# Patient Record
Sex: MALE | Race: WHITE
[De-identification: names, ages, dates, MRNs, and addresses within clinical notes are randomized per-mention and may not be internally consistent; named-entity substitution may affect disease eponyms.]

---

## 2020-02-17 ENCOUNTER — HOSPITAL ENCOUNTER (OUTPATIENT)
Dept: HOSPITAL 88 - MRI | Age: 35
End: 2020-02-17
Attending: SPECIALIST
Payer: COMMERCIAL

## 2020-02-17 DIAGNOSIS — S83.221A: Primary | ICD-10-CM

## 2020-02-17 NOTE — DIAGNOSTIC IMAGING REPORT
Right knee MRI without contrast.



History: Knee pain. Meniscus tear. Decreased range of motion.



Comparison: None.



Technique: Multiplanar multi-sequence MRI of the knee without contrast.



Findings: 



Medial compartment:  No meniscal tear or cartilage abnormality. Mild sprain of

the medial collateral ligament. The majority of the fibers are intact.



Lateral compartment: Mild degeneration and fraying of the lateral meniscus. No

lateral meniscus tear. Focal full-thickness articular cartilage defect at the

central weightbearing portion of the lateral femoral condyle with underlying

bone marrow edema best seen on coronal image 17. Bone contusion with bone

marrow edema at the posterior lateral tibial plateau. The lateral collateral

ligament complex is intact.



Intercondylar notch: Full-thickness anterior cruciate ligament tear best seen

on sagittal image 23. The posterior cruciate ligament is intact.



Patellofemoral compartment:  No chondromalacia or patellar dislocation.



Extensor mechanism:  The quadriceps and patellar tendons are normal.  



Other findings:  There is a large joint effusion and synovitis.  There is no

acute fracture, subluxation or avascular necrosis. Soft tissue edema about the

knee



IMPRESSION:  

Full-thickness anterior cruciate ligament tear 



Focal full-thickness articular cartilage defect at the central weightbearing

portion of the lateral femoral condyle with underlying bone marrow edema. Bone

contusion with bone marrow edema at the posterior lateral tibial plateau.



Mild sprain of the medial collateral ligament. The majority of the fibers are

intact.



Joint effusion and synovitis with soft tissue edema about the knee.



Signed by: Dr. German Yan M.D. on 2/17/2020 2:55 PM

## 2020-05-21 NOTE — XMS REPORT
Encounter??CCD:??10/14/2011??to??10/14/2011

                             Created on: 2079



SVETLANA RODRIGUES

External Reference #: [id number]

: 1985

Sex: Male



Demographics





                          Address                   [address] [place] [address]  [address]

 

                          Home Phone                [phone]

 

                          Preferred Language        Unknown

 

                          Marital Status            S

 

                          Amish Affiliation     [id number]

 

                          Race                      2028

 

                          Ethnic Group              OTH





Author





                          Author                    [employer] SVETLANA Cintron              [facility]

 

                          Address                   Unknown

 

                          Phone                     Unavailable







Care Team Providers





                    Care Team Member Name Role                Phone

 

                    Mike, April         CP                  [phone]

 

                    Mee Kurtz        CP                  Unavailable

 

                    [employer], Login  CP                  Unavailable

 

                    Penny Hdez    CP                  Unavailable

 

                    Sommer Roberson CP                  [phone]

 

                    Cynthia Benitez    CP                  Unavailable

 

                    Venu George     CP                  Unavailable

 

                    Faisal King  CP                  [phone]

 

                    Jose Mclaughlin        CP                  [phone]

 

                    Ana Huffman       CP                  [phone]

 

                    Marlys Rashid CP                  Unavailable







Allergies, Adverse Reactions, Alerts





  



                     Substance           Reaction            Status

 

  



                     NKDA                ??                  Active







Medications





    



              Medication   Instructions  Start Date   End Date     Status

 

    



              naproxen 500 mg oral  500 mg, PO, BID, PRN, 14 tab, Pain,  10/14/2

011   ??           

Ordered



                           enteric coated            Substitution Allowed   



                                         tablet    

 

    



              acetaminophen-hydroc  1-2 tab, PO, Q4-6H, PRN, 15 tab,  10/14/2011

   ??           Ordered



                           odone 500 mg-5 mg         Pain, Substitution Allowed,

   



                           oral tablet               Maintenance   

 

    



              Toradol 30 mg/mL  60 mg, Route: IM, ONCE, Start date:  10/14/2011 

  10/14/2011   

Completed



                           injectable solution       10/14/11 15:25:00, Stop aaron

e:   



                                         10/14/11 15:25:00   







Vital Signs





                    Most recent to oldest [Reference Range]: 1                  

 2

 

                          Height                    187.96 cm 

                          (10/14/2011 14:54:00) ??  ??

 

                          Temperature Oral [96.4-99.1 DegF] 98.0 DegF 

                          (10/14/2011 15:52:00) ??  97.7 DegF 

                                        (10/14/2011 14:54:00) ??

 

                          Systolic Blood Pressure [ mmHg] 145 mmHg 

*HI*

                          (10/14/2011 15:52:00) ??  165 mmHg 

*HI*

                                        (10/14/2011 14:54:00) ??

 

                          Diastolic Blood Pressure [60-90 mmHg] 80 mmHg 

                          (10/14/2011 15:52:00) ??  84 mmHg 

                                        (10/14/2011 14:54:00) ??

 

                          Respiratory Rate [14-20 BRMIN] 18 BRMIN 

                          (10/14/2011 15:52:00) ??  16 BRMIN 

                                        (10/14/2011 14:54:00) ??

 

                          Peripheral Pulse Rate [ bpm] 74 bpm 

                          (10/14/2011 15:52:00) ??  81 bpm 

                                        (10/14/2011 14:54:00) ??

 

                          Weight                    103.182 kg 

                          (10/14/2011 14:54:00) ??  ??

## 2020-05-21 NOTE — XMS REPORT
Patient Summary Document

                             Created on: 2020



SVETLANA RODRIGUES

External Reference #: [id number]

: 1985

Sex: Male



Demographics





                          Address                   [address] [place]  [address]

 

                          Home Phone                [phone]

 

                          Preferred Language        English

 

                          Marital Status            Unknown

 

                          Samaritan Affiliation     Unknown

 

                          Race                      Unknown

 

                                        Additional Race(s) 





 

                          Ethnic Group              Unknown





Author





                          Author                    [facility]

t

 

                          Organization              [facility]

 

                          Address                   [address]

[place]  [address]



 

                          Phone                     Unavailable







Care Team Providers





                    Care Team Member Name Role                Phone

 

                    DENISE CAM             Unavailable







Problems





           Condition Name Condition Details Condition Category Status     Onset 

Date Resolution

Date            Last Treatment Date Treating Clinician Comments        Source

 

                          LEG PAIN                                          LEG 

PAIN                        Active         

              10/14/2011                                                        
                                       [facility]                     

Diagnosis Active    2011-10-14 00:00:00           2011-10-14 15:37:00           

          [facility]







Allergies, Adverse Reactions, Alerts

This patient has no known allergies or adverse reactions.



Medications





             Ordered Medication Name Filled Medication Name Start Date   Stop Da

te    Current 

Medication? Ordering Clinician Indication Dosage     Frequency  Signature (SIG) 

Comments                  Components                Source

 

           naproxen 500 mg oral enteric coated tablet            2011-10-14 20:4

6:29            Yes        Sommer 

Cheryl Hanzik                                  500 mg, PO, BID, PRN, 14 tab, Adriano

n, Substitution Allowed             

                                        [facility]

 

           acetaminophen-hydrocodone 500 mg-5 mg oral tablet            2011-10-

14 20:46:26            Yes        

Sommer Cheryl Hanzik                                                 1-2 tab, PO

, Q4-6H, PRN, 15 tab, Pain, Substitution 

Allowed, Maintenance                                         [facility]

 

           Toradol 30 mg/mL injectable solution            2011-10-14 20:25:00  

          No         Sommer Cheryl 

Hanzik                                                          60 mg, Route: IM

, ONCE, Start date: 10/14/11 15:25:00, Stop date: 

10/14/11 15:25:00                                           [facility]







Vital Signs





             Vital Name   Observation Time Observation Value Comments     Source

 

             Diastolic (mm Hg) 2011-10-14 20:52:00                           [facility]

 

             Heart Rate   2011-10-14 20:52:00                           [facility]

 

             Systolic (mm Hg) 2011-10-14 20:52:00                            [facility]

 

             Respitory Rate 2011-10-14 20:52:00                            [facility]

 

             Temperature Oral (F) 2011-10-14 20:52:00 98.0 F                    

[facility]

 

             Weight       2011-10-14 19:54:00                           [facility]

 

             Height       2011-10-14 19:54:00 187.96 cm                 [facility]

 

             Systolic (mm Hg) 2011-10-14 19:54:00                            [facility]

 

             Temperature Oral (F) 2011-10-14 19:54:00 97.7 F                    

[facility]

 

             Respitory Rate 2011-10-14 19:54:00                            [facility]

 

             Diastolic (mm Hg) 2011-10-14 19:54:00                           [facility]

 

             Heart Rate   2011-10-14 19:54:00                           [facility]







Procedures

This patient has no known procedures.



Encounters





             Start Date/Time End Date/Time Encounter Type Admission Type Attendi

[facility]   Care Department Encounter ID    Source

 

        2018 00:00:00 2018 00:00:00 [facility]patient                 [facility] 

   [facility]    [id number] 

[facility]

 

        2011-10-14 14:53:00 2011-10-14 16:51:00 Emergency                 [facility]

[facility] 

[id number]                            [facility]







Results





           Test Description Test Time  Test Comments Results    Result Comments 

Source

 

                MRI RIGHT KNEE WO 2020 14:50:00                           

                              

                                             [place] [address]  
   Patient Name: SVETLANA RODRIGUES                                   MR #: 
[id number]                     : 1985                                  
Age/Sex: 34/M  Acct #: [id number]                              Req #: [id number]
[id number]  Adm Physician:                                                      
Ordered by: DENISE CAM MD                            Report #: [id number] 
      Location: MRI                                     Room/Bed:               
     
________________________________________________________________________________

___________________    Procedure: [id number] MRI/MRI RIGHT KNEE WO  Exam Date:   
                         Exam Time:                                             
 REPORT STATUS: Signed    Right knee MRI without contrast.      History: Knee 
pain. Meniscus tear. Decreased range of motion.      Comparison: None.      
Technique: Multiplanar multi-sequence MRI of the knee without contrast.      
Findings:       Medial compartment:  No meniscal tear or cartilage abnormality. 
Mild sprain of   the medial collateral ligament. The majority of the fibers are 
intact.      Lateral compartment: Mild degeneration and fraying of the lateral 
meniscus. No   lateral meniscus tear. Focal full-thickness articular cartilage 
defect at the   central weightbearing portion of the lateral femoral condyle 
with underlying   bone marrow edema best seen on coronal image 17. Bone 
contusion with bone   marrow edema at the posterior lateral tibial plateau. The 
lateral collateral   ligament complex is intact.      Intercondylar notch: Full-
thickness anterior cruciate ligament tear best seen   on sagittal image 23. The 
posterior cruciate ligament is intact.      Patellofemoral compartment:  No 
chondromalacia or patellar dislocation.      Extensor mechanism:  The quadriceps
and patellar tendons are normal.        Other findings:  There is a large joint 
effusion and synovitis.  There is no   acute fracture, subluxation or avascular 
necrosis. Soft tissue edema about the   knee      IMPRESSION:     Full-thickness
anterior cruciate ligament tear       Focal full-thickness articular cartilage 
defect at the central weightbearing   portion of the lateral femoral condyle 
with underlying bone marrow edema. Bone   contusion with bone marrow edema at 
the posterior lateral tibial plateau.      Mild sprain of the medial collateral 
ligament. The majority of the fibers are   intact.      Joint effusion and 
synovitis with soft tissue edema about the knee.      Signed by: Dr. German Yan M.D. on 2020 2:55 PM        Dictated By: GERMAN YAN MD, MD  
Electronically Signed By: GERMAN YAN MD, MD on 20 0960  Transcribed By: 
CELESTINO on 20 4590       COPY TO:   DENISE CAM MD

## 2020-05-21 NOTE — XMS REPORT
Continuity of Care Document

                             Created on: 2020



SVETLANA RODRIGUES

External Reference #: [id number]

: 1985

Sex: Male



Demographics





                          [place]  [address]

 

                          Home Phone                [phone]

 

                          Preferred Language        English

 

                          Marital Status            Unknown

 

                          [employer] Affiliation     Unknown

 

                          Race                      Unknown

 

                          Ethnic Group              Unknown





Author





                          Author                    Alton TicketlandSVETLANA              [place]

 

                          Address                   Unknown

 

                          Phone                     Unavailable







Care Team Providers





                    Care Team Member Name Role                Phone

 

                    [employer] Unavailable         Un

available



                                    



Problems

                    



                    Problem                         Status                      

   Onset Date       

                          Classification                         Date Reported  

         

                          Comments                         Source               

     

 

                    LEG PAIN                         Active                     

    10/14/2011      

                                                                                

  

                                        [facility]                    



                                                                        



Medications

                    



                    Medication                         Details                  

       Route        

                          Status                         Patient Instructions   

          

                          Ordering Provider                         Order Date  

               

                                        Source                    

 

                          naproxen 500 mg oral enteric coated tablet            

             500 mg, PO, 

BID, PRN, 14 tab, Pain, Substitution Allowed                         PO         

                    Active                                                  Marc marroquin   

                          10/14/2011                         [facility]       

     

       

 

                          acetaminophen-hydrocodone 500 mg-5 mg oral tablet     

                    1-2 

tab, PO, Q4-6H, PRN, 15 tab, Pain, Substitution Allowed, Maintenance            
                    PO                         Active                           

        

                    Karol                         10/14/2011                   

      

[facility]                    

 

                          Toradol 30 mg/mL injectable solution                  

       60 mg, Route: IM, 

ONCE, Start date: 10/14/11 15:25:00, Stop date: 10/14/11 15:25:00               
                    IM                         No Longer Active                 

           

                          Octaviomarimar                         10/14/2011             

     

                                        [facility]                    



                                                                                
                                   



Allergies, Adverse Reactions, Alerts

        



                                        No Known Medication Allergies           

           



                                                        



Immunizations

        



                                        No Data Provided for This Section



                                     



Results





                                        No Data Provided for This Section



                    



Pathology Reports





                                        No Data Provided for This Section       

             



                            



Diagnostic Reports

            



                                        No Data Provided for This Section       

             



                                                            



Consultation Notes

                    



                                        No Data Provided for This Section       

             



                                                            



Discharge Summaries

                    



                                        No Data Provided for This Section       

             



                                                            



History and Physicals

                    



                                        No Data Provided for This Section       

             



                                                                



Vital Signs

                     



                    Vital Sign                         Value                    

     Date           

                          Comments                         Source               

     

 

                    Diastolic (mm Hg)                         80.0              

            

10/14/2011                                                   [facility]       

 

           

 

                    Heart Rate                         74.0                     

     10/14/2011     

                                                    [facility]                

    

 

                    Systolic (mm Hg)                         145.0              

            

10/14/2011                                                   [facility]       

 

           

 

                    Respitory Rate                         18.0                 

         10/14/2011 

                                                    [facility]                

    

 

                    Temperature Oral (F)                         98.0 F         

                

10/14/2011                                                   [facility]       

 

           

 

                    Weight                         103.182                      

    10/14/2011      

                                                    [facility]                

    

 

                    Height                         187.96 cm                    

     10/14/2011     

                                                    [facility]                

    

 

                    Systolic (mm Hg)                         165.0              

            

10/14/2011                                                   [facility]       

 

           

 

                    Temperature Oral (F)                         97.7 F         

                

10/14/2011                                                   [facility]       

 

           

 

                    Respitory Rate                         16.0                 

         10/14/2011 

                                                    [facility]                

    

 

                    Diastolic (mm Hg)                         84.0              

            

10/14/2011                                                   [facility]       

 

           

 

                    Heart Rate                         81.0                     

     10/14/2011     

                                                    [facility]                

    



                                                                                
                                                                                
                                                                                
                     



Encounters

                    



                    Location                         Location Details           

              

Encounter Type                         Encounter Number                         

Reason For Visit                         Attending Provider                     

                    ADM Date                         DC Date                    

     Status      

                                        Source                    

 

                    [facility]                                                

  Emergency         

                    [id number]                                                

  

ADRIEL SINCLAIR                          10/14/2011                         

10/14/2011                         Discharged                         [facility]                    



                                                                        



Procedures

        



                                        No Data Provided for This Section



                                                    



Assessment and Plan

                    



                                        No Data Provided for This Section       

             



                                     



Plan of Care





                                        No Data Provided for This Section       

             



                                                            



Social History

                    



                                        No Data Provided for This Section       

             



                                                                 



Family History

                    



                                        No Data Provided for This Section       

             



                                                            



Advance Directives

                    



                                        No Data Provided for This Section       

             



                                                            



Functional Status

                    



                                        No Data Provided for This Section

## 2020-06-02 NOTE — OPERATIVE REPORT
DATE OF PROCEDURE:  05/21/2020

 

SURGEON:  Perry Up MD

 

PREOPERATIVE DIAGNOSIS:  Right knee anterior cruciate ligament tear.

 

POSTOPERATIVE DIAGNOSES:  

1. Right knee anterior cruciate ligament tear.

2. Right knee partial lateral meniscus tear.

3. 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

 

OPERATION AND PROCEDURES PERFORMED:  The patient underwent right knee examination under

anesthesia, right knee arthroscopy, right knee partial lateral meniscectomy, right knee

chondroplasty of the lateral femoral condyle, and a right knee allograft anterior

cruciate ligament reconstruction. 

 

ASSISTANT:  None.

 

ANESTHESIA:  General anesthesia as well as a regional block.

 

IV FLUIDS:  Per the anesthesia record.

 

COMPLICATIONS:  None.

 

BRIEF DISCUSSION OF THE PATIENT'S OPERATIVE PROCEDURE:  Mr. Brewer was taken to the

operating room and placed in the supine position on the operating table.  Following

induction of general anesthesia as well as endotracheal intubation, the patient's right

lower extremity was examined under anesthesia.  He was found to have a mild effusion

within the knee joint.  He had a positive Lachman's test and positive anterior drawer

test.  Tests for posterior and lateral corner injury were negative.  The patient's lower

extremity was prepped and draped in a standard surgical fashion.  A 2-port technique was

used to provide this patient arthroscopic evaluation of his knee joint.  The scope was

placed within the knee joint atraumatically.  Examination of the patellofemoral joint

demonstrated no significant chondromalacia.  There were no loose bodies in the medial or

lateral gutter.  The scope was advanced to the medial compartment and no pathology was

identified.  The scope was placed in the intercondylar notch and the anterior cruciate

ligament was found to be torned.  The scope was then advanced to the lateral compartment

and there was chondromalacia of the lateral femoral condyle.  There was also a torn

lateral meniscus.  A combination of biting forceps and motorized shaver was used to

resect the torn portion of the meniscus.  A chondroplasty of the lateral femoral condyle

was performed at this time.  Attention was then turned to the anterior cruciate

ligament.  The patient had a preoperative medial collateral ligament injury.  Therefore,

allograft was used for this patient's anterior cruciate ligament reconstruction.  A 10

mm semitendinosus graft was thawed.  The remnants of the anterior cruciate ligament were

resected.  A notchplasty was performed.  The guide for the tibial tunnel was inserted

into the knee and adjusted appropriately.  The guide pin was advanced into the knee and

was found to be in a proper position.  The tibial tunnel was then drilled.  A #7

over-the-top guide was then placed in the proper position and the femoral guide pin was

advanced through the femur.  Measurements were taken and the femoral tunnel was then

created.  Dilators were then used to dilate the femoral and tibial tunnels.  The graft

was then passed into the knee and positioned and seated into the femoral tunnel.  The

knee was cycled through range of motion with tension on the graft.  The leg was then

held in slight flexion.  The Endobutton was felt to deploy.  Tension was placed across

the graft and the graft was cycled.  The knee was held in slight flexion varus and a

posterior force was placed across the tibia as the interference screw was inserted into

the tibial tunnel.  The interference screw had excellent purchase.  The knee was again

placed through range of motion and found to have full motion.  The scope was placed

within the knee and evaluation of the knee demonstrated no impingement of the graft

through the arc in range of motion.  The graft was found to be in good position with

excellent tension.  The knee was then deflated __________ multiple wounds were closed in

a multilayer fashion.  Sterile dressings were applied and the patient was provided a

Jonathan brace, awakened and taken to the postanesthesia care unit in stable condition. 

 

 

 

 

______________________________

MD DEVIN Woods/FLORI

D:  06/02/2020 16:13:47

T:  06/02/2020 21:51:56

Job #:  [id number]

## 2020-06-22 NOTE — DIAGNOSTIC IMAGING REPORT
Left knee MRI without contrast.



History: Knee pain. Meniscus tear medial. Decreased range of motion. Prior

surgery.



Comparison: None.



Technique: Multiplanar multi-sequence MRI of the knee without contrast.



Findings: 



Medial compartment: Complex tear involving the posterior horn and body segments

of the medial meniscus. Articular cartilage fraying and fissuring in the medial

compartment with underlying bone marrow edema. Peripheral marginal osteophytes.

The medial collateral ligament complex is intact.



Lateral compartment: Degeneration and fraying of the lateral meniscus. No

lateral meniscus tear is seen. Articular cartilage fraying and deep fissuring

in the lateral compartment with underlying bone marrow edema. Peripheral

marginal osteophytes. The lateral collateral ligament complex is intact.



Intercondylar notch: Prior anterior cruciate ligament reconstruction with

associated postsurgical change and metallic artifact. Mid substance

degeneration of the graft. The graft is intact. The posterior cruciate ligament

is intact.



Patellofemoral compartment: Articular cartilage fraying and fissuring in the

patellofemoral compartment. Bipartite superior-lateral patella.



Extensor mechanism:  The quadriceps and patellar tendons are normal.  



Other findings:  There is a joint effusion and synovitis with debris/small

loose bodies.  There is no acute fracture, subluxation or avascular necrosis.



IMPRESSION:  

Complex medial meniscus tear with associated degenerative arthrosis in the

medial compartment of the knee.



Prior anterior cruciate ligament reconstruction with associated postsurgical

change. The graft is intact.



Articular cartilage fraying and deep fissuring in the lateral compartment with

underlying bone marrow edema.



Joint effusion and synovitis containing debris/small loose bodies.



Signed by: Dr. German Yan M.D. on 6/22/2020 9:12 AM